# Patient Record
Sex: MALE | Race: OTHER | HISPANIC OR LATINO | URBAN - METROPOLITAN AREA
[De-identification: names, ages, dates, MRNs, and addresses within clinical notes are randomized per-mention and may not be internally consistent; named-entity substitution may affect disease eponyms.]

---

## 2017-03-22 ENCOUNTER — EMERGENCY (EMERGENCY)
Facility: HOSPITAL | Age: 27
LOS: 0 days | Discharge: HOME | End: 2017-03-22

## 2017-06-27 DIAGNOSIS — K64.9 UNSPECIFIED HEMORRHOIDS: ICD-10-CM

## 2017-06-27 DIAGNOSIS — K08.89 OTHER SPECIFIED DISORDERS OF TEETH AND SUPPORTING STRUCTURES: ICD-10-CM

## 2017-06-27 DIAGNOSIS — Z87.891 PERSONAL HISTORY OF NICOTINE DEPENDENCE: ICD-10-CM

## 2017-08-15 ENCOUNTER — EMERGENCY (EMERGENCY)
Facility: HOSPITAL | Age: 27
LOS: 1 days | Discharge: HOME | End: 2017-08-15

## 2017-08-15 DIAGNOSIS — K08.89 OTHER SPECIFIED DISORDERS OF TEETH AND SUPPORTING STRUCTURES: ICD-10-CM

## 2017-08-15 DIAGNOSIS — Z79.899 OTHER LONG TERM (CURRENT) DRUG THERAPY: ICD-10-CM

## 2017-12-11 ENCOUNTER — EMERGENCY (EMERGENCY)
Facility: HOSPITAL | Age: 27
LOS: 0 days | Discharge: HOME | End: 2017-12-11

## 2017-12-11 DIAGNOSIS — K02.9 DENTAL CARIES, UNSPECIFIED: ICD-10-CM

## 2017-12-11 DIAGNOSIS — K08.89 OTHER SPECIFIED DISORDERS OF TEETH AND SUPPORTING STRUCTURES: ICD-10-CM

## 2017-12-11 DIAGNOSIS — Z21 ASYMPTOMATIC HUMAN IMMUNODEFICIENCY VIRUS [HIV] INFECTION STATUS: ICD-10-CM

## 2017-12-11 DIAGNOSIS — K04.7 PERIAPICAL ABSCESS WITHOUT SINUS: ICD-10-CM

## 2017-12-11 DIAGNOSIS — Z79.899 OTHER LONG TERM (CURRENT) DRUG THERAPY: ICD-10-CM

## 2017-12-11 DIAGNOSIS — Z87.891 PERSONAL HISTORY OF NICOTINE DEPENDENCE: ICD-10-CM

## 2021-07-21 ENCOUNTER — EMERGENCY (EMERGENCY)
Facility: HOSPITAL | Age: 31
LOS: 0 days | Discharge: HOME | End: 2021-07-22
Attending: EMERGENCY MEDICINE | Admitting: EMERGENCY MEDICINE
Payer: MEDICAID

## 2021-07-21 VITALS
RESPIRATION RATE: 17 BRPM | HEART RATE: 81 BPM | TEMPERATURE: 98 F | SYSTOLIC BLOOD PRESSURE: 136 MMHG | OXYGEN SATURATION: 100 % | DIASTOLIC BLOOD PRESSURE: 77 MMHG

## 2021-07-21 DIAGNOSIS — K04.7 PERIAPICAL ABSCESS WITHOUT SINUS: ICD-10-CM

## 2021-07-21 DIAGNOSIS — K03.81 CRACKED TOOTH: ICD-10-CM

## 2021-07-21 DIAGNOSIS — K08.89 OTHER SPECIFIED DISORDERS OF TEETH AND SUPPORTING STRUCTURES: ICD-10-CM

## 2021-07-21 DIAGNOSIS — K02.9 DENTAL CARIES, UNSPECIFIED: ICD-10-CM

## 2021-07-21 DIAGNOSIS — K05.219 AGGRESSIVE PERIODONTITIS, LOCALIZED, UNSPECIFIED SEVERITY: ICD-10-CM

## 2021-07-21 DIAGNOSIS — J45.909 UNSPECIFIED ASTHMA, UNCOMPLICATED: ICD-10-CM

## 2021-07-21 PROCEDURE — 99282 EMERGENCY DEPT VISIT SF MDM: CPT

## 2021-07-21 NOTE — ED PROVIDER NOTE - OBJECTIVE STATEMENT
30 y/o male with a PMH of asthma presents to the ED for evaluation of right upper dental pain and swellig x 2 days. pt denies fever, chills, sob, difficulty eating, n/v/d/c, or recent trauma.

## 2021-07-21 NOTE — CONSULT NOTE ADULT - SUBJECTIVE AND OBJECTIVE BOX
Patient is a 31y old  Male who presents with a chief complaint of dental pain on upper right that started two weeks ago.    HPI: Patient has dental pain on tooth #4 that started two weeks ago. Patient is tender to cold and biting. Took advil to alleviate the pain the past two weeks, which helped.      PAST MEDICAL & SURGICAL HISTORY:    MEDICATIONS  (STANDING):    MEDICATIONS  (PRN):      Allergies    No Known Allergies    Intolerances        FAMILY HISTORY:        *Last Dental Visit:    Vital Signs Last 24 Hrs  T(C): 36.8 (21 Jul 2021 13:38), Max: 36.8 (21 Jul 2021 13:38)  T(F): 98.2 (21 Jul 2021 13:38), Max: 98.2 (21 Jul 2021 13:38)  HR: 81 (21 Jul 2021 13:38) (81 - 81)  BP: 136/77 (21 Jul 2021 13:38) (136/77 - 136/77)  BP(mean): --  RR: 17 (21 Jul 2021 13:38) (17 - 17)  SpO2: 100% (21 Jul 2021 13:38) (100% - 100%)    LABS:                  EOE:  TMJ ( -  ) clicks                     ( -  ) pops                     ( -  ) crepitus             Mandible <<FROM>>             Facial bones and MOM <<grossly intact>>             ( -  ) trismus             ( -  ) lymphadenopathy             ( -  ) swelling             ( -  ) asymmetry             ( -  ) palpation             ( -  ) dyspnea             (  - ) dysphagia             ( -  ) loss of consciousness    IOE:  permanent dentition: multiple carious teeth           hard/soft palate:  (  - ) palatal torus           tongue/FOM No pathology noted           labial/buccal mucosa No pathology noted           ( +  ) percussion           (  + ) palpation           ( -  ) swelling            ( +  ) abscess           ( -  ) sinus tract    Dentition present: permanent dentition  Mobility: no mobility  Caries: caries to pulp on tooth #4        *DENTAL RADIOGRAPHS: 1 panoramic, 2 periapicals    RADIOLOGY & ADDITIONAL STUDIES: caries to pulp with periapical radiolucency on tooth #4, residual teeth roots #1 and 2    *ASSESSMENT: Patient is a 31y old  Male who presents with a chief complaint of dental pain on upper right that started two weeks ago. Patient has dental pain on tooth #4. Patient is tender to cold and biting. Patient took advil to alleviate the pain the past two weeks, which helped. Patient is tender to palpation and percussion. No swelling present. Abscess present on the buccal. Explained to patient that the tooth needs to be extracted because it is non restorable. Patient agrees to treatment.      *PLAN: Extraction of tooth #4. All treatment risks and benefits explained to patient as per OS sheet dated 7-. Consent and side/site signed and completed.    PROCEDURE:   Verbal and written consent given.  Anesthesia: 3 carpules of septocaine 4% with 1:100 000 epinephrine   Treatment: 3 carpules of septocaine 4% with 1:100 000 epinephrine administered via local infiltration. Tooth #4 elevated and extracted with forceps. Site curreted and irrigated. Hemostasis obtained. Horizontal incision made on the buccal to drain the abscess. Abscess irrigated. Hemostasis obtained. Patient comfortable. Post op instructions given verbally and written.     RX:  Amoxicilline 500 mg PO TID X 7 days  Ibuprofen 600 mg PO q6h prn    RECOMMENDATIONS:  1) Take Amoxicilline 500 mg PO TID X 7 days  2) Take Ibuprofen 600 mg PO q6h prn  3) Post op instructions given verbally and written  4) Dental F/U with outpatient dentist for comprehensive dental care.   5) If any difficulty swallowing/breathing, fever occur, return to ER.     Leeann Zavala, pager # [phone]

## 2021-07-21 NOTE — ED PROVIDER NOTE - ATTENDING CONTRIBUTION TO CARE
30 yo m with pmh of asthma, presents with R gum pain.  pt admits had cracked his tooth a few weeks ago, but only started hurting 2 days ago.  no fever, chills, facial swelling, diff swallowing or breathing.  exam: gingiva adjacent to ttoh 4,5,6, swollen, fluctuant, erythematous imp: pt with gingival abscess, dental clinic aou

## 2021-07-21 NOTE — ED PROVIDER NOTE - PHYSICAL EXAMINATION
Physical Exam    Vital Signs: I have reviewed the initial vital signs.  Constitutional: well-nourished, appears stated age, no acute distress  Eyes: Conjunctiva pink, Sclera clear  ENT: Oropharynx is clear without lesions. uvula midline. no tonsillar erythema, edema, or exudates. no stridor. no pta. floor of the mouth is soft without pus. (+) right upper tooth #3 cracked and has dental caries. flucutuance tender dental abscess above tooth #3 without drainage.   Cardiovascular: S1 and S2, regular rate, regular rhythm, well-perfused extremities, radial pulses equal and 2+ b/l.   Respiratory: unlabored respiratory effort, clear to auscultation bilaterally no wheezing, rales and rhonchi. pt is speaking full sentences. no accessory muscle use.   Musculoskeletal: supple neck, FROM of b/l upper and lower extremities.   Integumentary: warm, dry, no rash  Neurologic: awake, alert, cranial nerves II-XII grossly intact, extremities’ motor and sensory functions grossly intact. steady gait.   Psychiatric: appropriate mood, appropriate affect

## 2021-07-21 NOTE — ED PROVIDER NOTE - NS ED ROS FT
CONST: No fever, chills or bodyaches  EYES: No pain, redness, drainage or visual changes.  ENT: No ear pain or discharge, nasal discharge or congestion. No sore throat. (+) right upper dental pain and swelling.   CARD: No chest pain, palpitations  RESP: No SOB, cough, hemoptysis. No hx of asthma or COPD  GI: No abdominal pain, N/V/D  : No urinary symptoms  MS: No joint pain, back pain or extremity pain/injury  SKIN: No rashes  NEURO: No headache, dizziness, paresthesias or LOC

## 2021-10-15 ENCOUNTER — EMERGENCY (EMERGENCY)
Facility: HOSPITAL | Age: 31
LOS: 0 days | Discharge: LEFT BEFORE DISCHARGE | End: 2021-10-15
Attending: STUDENT IN AN ORGANIZED HEALTH CARE EDUCATION/TRAINING PROGRAM | Admitting: STUDENT IN AN ORGANIZED HEALTH CARE EDUCATION/TRAINING PROGRAM
Payer: MEDICAID

## 2021-10-15 VITALS
RESPIRATION RATE: 18 BRPM | HEART RATE: 85 BPM | TEMPERATURE: 98 F | DIASTOLIC BLOOD PRESSURE: 77 MMHG | OXYGEN SATURATION: 98 % | SYSTOLIC BLOOD PRESSURE: 138 MMHG

## 2021-10-15 DIAGNOSIS — B20 HUMAN IMMUNODEFICIENCY VIRUS [HIV] DISEASE: ICD-10-CM

## 2021-10-15 DIAGNOSIS — B34.9 VIRAL INFECTION, UNSPECIFIED: ICD-10-CM

## 2021-10-15 DIAGNOSIS — Z20.822 CONTACT WITH AND (SUSPECTED) EXPOSURE TO COVID-19: ICD-10-CM

## 2021-10-15 DIAGNOSIS — R06.02 SHORTNESS OF BREATH: ICD-10-CM

## 2021-10-15 DIAGNOSIS — J45.901 UNSPECIFIED ASTHMA WITH (ACUTE) EXACERBATION: ICD-10-CM

## 2021-10-15 LAB
RAPID RVP RESULT: DETECTED
RV+EV RNA SPEC QL NAA+PROBE: DETECTED
SARS-COV-2 RNA SPEC QL NAA+PROBE: SIGNIFICANT CHANGE UP

## 2021-10-15 PROCEDURE — 99285 EMERGENCY DEPT VISIT HI MDM: CPT

## 2021-10-15 PROCEDURE — 71046 X-RAY EXAM CHEST 2 VIEWS: CPT | Mod: 26

## 2021-10-15 RX ORDER — IPRATROPIUM/ALBUTEROL SULFATE 18-103MCG
3 AEROSOL WITH ADAPTER (GRAM) INHALATION
Refills: 0 | Status: COMPLETED | OUTPATIENT
Start: 2021-10-15 | End: 2021-10-15

## 2021-10-15 RX ORDER — DEXAMETHASONE 0.5 MG/5ML
10 ELIXIR ORAL ONCE
Refills: 0 | Status: COMPLETED | OUTPATIENT
Start: 2021-10-15 | End: 2021-10-15

## 2021-10-15 RX ADMIN — Medication 3 MILLILITER(S): at 12:10

## 2021-10-15 RX ADMIN — Medication 3 MILLILITER(S): at 12:11

## 2021-10-15 RX ADMIN — Medication 10 MILLIGRAM(S): at 11:48

## 2021-10-15 RX ADMIN — Medication 3 MILLILITER(S): at 12:08

## 2021-10-15 NOTE — ED PROVIDER NOTE - OBJECTIVE STATEMENT
30 y/o male with a PMH of asthma, and HIV (on victarvi, does not know his cd4 count, states viral load is undetectable) presents to the ED for evaluation of chest tightness and sob, sore throat, runny nose, and cough with green sputum x 5 days. pt reports he uses his albuterol inhaler and nebulizer and it usually resolves his symptoms however this week it has not. pt reports his brother has similar symptoms. pt is vaccinated against covid 19. pt reports feeling feverish. pt reports he has been taking dayquil, last taken last night. pt denies fever, chills, cough, chest pain, abdominal pain, rashes, n/v/d/c, recent travel, weakness, headaches, or dizziness.

## 2021-10-15 NOTE — ED PROVIDER NOTE - NS ED ROS FT
CONST: No fever, chills or bodyaches  EYES: No pain, redness, drainage or visual changes.  ENT: No ear pain or discharge, nasal discharge or congestion. No sore throat  CARD: No chest pain, palpitations  RESP: (+) SOB, cough. No hemoptysis. (+) hx of asthma   GI: No abdominal pain, N/V/D  : No urinary symptoms  MS: No joint pain, back pain or extremity pain/injury  SKIN: No rashes  NEURO: No headache, dizziness, paresthesias or LOC

## 2021-10-15 NOTE — ED PROVIDER NOTE - PHYSICAL EXAMINATION
Physical Exam    Vital Signs: I have reviewed the initial vital signs.  Constitutional: well-nourished, appears stated age, no acute distress  Eyes: Conjunctiva pink, Sclera clear.   Cardiovascular: S1 and S2, regular rate, regular rhythm, well-perfused extremities, radial pulses equal and 2+ b/l.   Respiratory: unlabored respiratory effort,(+) diffuse b/l wheezing. pt is speaking full sentences. no accessory muscle use.   Gastrointestinal: soft, non-tender, nondistended abdomen, no pulsatile mass, normal bowl sounds, no rebound, no guarding, no organomegaly.   Musculoskeletal: supple neck, no lower extremity edema, no calf tenderness  Integumentary: warm, dry, no rash  Neurologic: awake, alert, cranial nerves II-XII grossly intact, extremities’ motor and sensory functions grossly intact.  steady gait.   Psychiatric: appropriate mood, appropriate affect

## 2021-10-15 NOTE — ED PROVIDER NOTE - CLINICAL SUMMARY MEDICAL DECISION MAKING FREE TEXT BOX
.    32 y/o M pmh Asthma, HIV (+), on meds, VL undetectable, does not know CD4, p/w nasal congestion, sorethroat, chest tightness, wheezing and cough. x 4-5d. Seemed like cold and asthma, but sx not responding to albuterol. No fever, cp, leg swelling. No sick contact. + COVID vax.    Agree w/ exam, plus: ENT: + nasal congestion and rhinorrhea, no OP swelling or exudate.    Pt felt better after nebs. cxr clear.  IMP: viral illness  Pt stable for dc w/ continued oupt w/up, pmd f/up, and care as discussed.  Pt understands plan and signs and symptoms for ED return. DC home.     Pt left before receiving dc papers.    .

## 2021-10-15 NOTE — ED ADULT NURSE NOTE - SUICIDE SCREENING DEPRESSION
Addended by: LUIS DANIEL BOGGS on: 3/17/2021 02:53 PM     Modules accepted: Orders    
Addended by: LUIS DANIEL BOGGS on: 3/24/2021 09:43 AM     Modules accepted: Orders    
Negative

## 2021-10-15 NOTE — ED PROVIDER NOTE - PROGRESS NOTE DETAILS
FF: pt was seen walking around the ED, but not in his spot in the ED. unable to reassess. FF: pt was seen walking around the ED, and eating chicken wings with his mother, but not in his spot in the ED or to be found by myself when searching for pt. unable to reassess; however plan was to discharge if feeling better and symptoms improved. FF: I attempted to call pt but his number is wrong in the chart a different perso answered and it was the wrong number. I attempted to call pt mother katie, but left a voicemail for pt to call back. pt rvp is positive. FF: pt was seen walking around the ED, and eating chicken wings with his mother, but not in his spot in the ED or to be found by myself when searching for pt. unable to reassess; however plan was to discharge if feeling better and symptoms improved. pt eloped.

## 2023-09-09 NOTE — ED PROVIDER NOTE - NSFOLLOWUPINSTRUCTIONS_ED_ALL_ED_FT
Viral Illness, Adult  Viruses are tiny germs that can get into a person's body and cause illness. There are many different types of viruses, and they cause many types of illness. Viral illnesses can range from mild to severe. They can affect various parts of the body.    Common illnesses that are caused by a virus include colds and the flu. Viral illnesses also include serious conditions such as HIV/AIDS (human immunodeficiency virus/acquired immunodeficiency syndrome). A few viruses have been linked to certain cancers.    What are the causes?  Many types of viruses can cause illness. Viruses invade cells in your body, multiply, and cause the infected cells to malfunction or die. When the cell dies, it releases more of the virus. When this happens, you develop symptoms of the illness, and the virus continues to spread to other cells. If the virus takes over the function of the cell, it can cause the cell to divide and grow out of control, as is the case when a virus causes cancer.    Different viruses get into the body in different ways. You can get a virus by:  Swallowing food or water that is contaminated with the virus.  Breathing in droplets that have been coughed or sneezed into the air by an infected person.  Touching a surface that has been contaminated with the virus and then touching your eyes, nose, or mouth.  Being bitten by an insect or animal that carries the virus.  Having sexual contact with a person who is infected with the virus.  Being exposed to blood or fluids that contain the virus, either through an open cut or during a transfusion.  If a virus enters your body, your body's defense system (immune system) will try to fight the virus. You may be at higher risk for a viral illness if your immune system is weak.    What are the signs or symptoms?  Symptoms vary depending on the type of virus and the location of the cells that it invades. Common symptoms of the main types of viral illnesses include:    Cold and flu viruses     Fever.  Headache.  Sore throat.  Muscle aches.  Nasal congestion.  Cough.  Digestive system (gastrointestinal) viruses     Fever.  Abdominal pain.  Nausea.  Diarrhea.  Liver viruses (hepatitis)     Loss of appetite.  Tiredness.  Yellowing of the skin (jaundice).  Brain and spinal cord viruses     Fever.  Headache.  Stiff neck.  Nausea and vomiting.  Confusion or sleepiness.  Skin viruses     Warts.  Itching.  Rash.  Sexually transmitted viruses     Discharge.  Swelling.  Redness.  Rash.  How is this treated?  Viruses can be difficult to treat because they live within cells. Antibiotic medicines do not treat viruses because these drugs do not get inside cells. Treatment for a viral illness may include:  Resting and drinking plenty of fluids.  Medicines to relieve symptoms. These can include over-the-counter medicine for pain and fever, medicines for cough or congestion, and medicines to relieve diarrhea.  Antiviral medicines. These drugs are available only for certain types of viruses. They may help reduce flu symptoms if taken early. There are also many antiviral medicines for hepatitis and HIV/AIDS.  Some viral illnesses can be prevented with vaccinations. A common example is the flu shot.    Follow these instructions at home:  Medicines     Image   Take over-the-counter and prescription medicines only as told by your health care provider.  If you were prescribed an antiviral medicine, take it as told by your health care provider. Do not stop taking the medicine even if you start to feel better.  Be aware of when antibiotics are needed and when they are not needed. Antibiotics do not treat viruses. If your health care provider thinks that you may have a bacterial infection as well as a viral infection, you may get an antibiotic.  Do not ask for an antibiotic prescription if you have been diagnosed with a viral illness. That will not make your illness go away faster.  Frequently taking antibiotics when they are not needed can lead to antibiotic resistance. When this develops, the medicine no longer works against the bacteria that it normally fights.  General instructions     Drink enough fluids to keep your urine clear or pale yellow.  Rest as much as possible.  Return to your normal activities as told by your health care provider. Ask your health care provider what activities are safe for you.  Keep all follow-up visits as told by your health care provider. This is important.  How is this prevented?  Take these actions to reduce your risk of viral infection:Image  Eat a healthy diet and get enough rest.  Wash your hands often with soap and water. This is especially important when you are in public places. If soap and water are not available, use hand .  Avoid close contact with friends and family who have a viral illness.  If you travel to areas where viral gastrointestinal infection is common, avoid drinking water or eating raw food.  Keep your immunizations up to date. Get a flu shot every year as told by your health care provider.  Do not share toothbrushes, nail clippers, razors, or needles with other people.  Always practice safe sex.  Contact a health care provider if:  You have symptoms of a viral illness that do not go away.  Your symptoms come back after going away.  Your symptoms get worse.  Get help right away if:  You have trouble breathing.  You have a severe headache or a stiff neck.  You have severe vomiting or abdominal pain.  This information is not intended to replace advice given to you by your health care provider. Make sure you discuss any questions you have with your health care provider.    Asthma is a common lung disease affecting millions of people worldwide. It is characterized by narrowing of the airways (breathing tubes) in the lungs. This narrowing is partially or completely reversible. Symptoms of asthma include wheezing, coughing, chest tightness, and shortness of breath. These symptoms tend to come and go, and are related to the degree of airway narrowing in the lungs.     The factors that set off and worsen asthma symptoms are called "triggers." Identifying and avoiding asthma triggers are essential steps in preventing asthma flare-ups. Common asthma triggers generally fall into several categories:  ? Allergens (including dust, pollen, mold, cockroaches, mice, cats, and dogs)  ? Respiratory infections (including the common cold)  ? Irritants (such as tobacco smoke, chemicals, and strong odors or fumes)  ? Physical activity, especially when the air that is breathed is cold  ? Certain medicines, known as beta blockers  ? Emotional stress    After identifying potential asthma triggers, try to avoid your triggers entirely. If an asthma trigger cannot be completely avoided, try to limit your exposure as much as possible. You can also take an extra dose of your bronchodilator medication before exposure to an asthma trigger. This is a common approach prior to exercise, and we encourage you NOT to avoid exercise. Talk with a healthcare provider before using this approach in other circumstances, as it should only be used if limiting or avoiding exposure is not possible. Be careful not to use more than twice the amount of medication normally used.      Be sure to take all asthma medications as prescribed.   It is important that you are consistent and do not miss taking the ones that are meant to be taken daily, even if your breathing already feels well.       Please follow up with your Primary Care Doctor or a Pulmonologist (lung doctor) within 1 - 2 weeks of discharge. If you do not already have an asthma action plan, please discuss establishing one with your outpatient doctors.
No

## 2025-05-16 ENCOUNTER — EMERGENCY (EMERGENCY)
Facility: HOSPITAL | Age: 35
LOS: 0 days | Discharge: HOME | End: 2025-05-17
Attending: EMERGENCY MEDICINE
Payer: MEDICAID

## 2025-05-16 VITALS
TEMPERATURE: 99 F | RESPIRATION RATE: 18 BRPM | SYSTOLIC BLOOD PRESSURE: 135 MMHG | WEIGHT: 199.96 LBS | OXYGEN SATURATION: 100 % | HEART RATE: 83 BPM | DIASTOLIC BLOOD PRESSURE: 85 MMHG

## 2025-05-16 DIAGNOSIS — M25.511 PAIN IN RIGHT SHOULDER: ICD-10-CM

## 2025-05-16 DIAGNOSIS — R20.0 ANESTHESIA OF SKIN: ICD-10-CM

## 2025-05-16 DIAGNOSIS — M54.2 CERVICALGIA: ICD-10-CM

## 2025-05-16 DIAGNOSIS — M79.601 PAIN IN RIGHT ARM: ICD-10-CM

## 2025-05-16 DIAGNOSIS — M25.521 PAIN IN RIGHT ELBOW: ICD-10-CM

## 2025-05-16 DIAGNOSIS — Z21 ASYMPTOMATIC HUMAN IMMUNODEFICIENCY VIRUS [HIV] INFECTION STATUS: ICD-10-CM

## 2025-05-16 DIAGNOSIS — R20.2 PARESTHESIA OF SKIN: ICD-10-CM

## 2025-05-16 PROCEDURE — 99284 EMERGENCY DEPT VISIT MOD MDM: CPT | Mod: 25

## 2025-05-16 PROCEDURE — 72125 CT NECK SPINE W/O DYE: CPT

## 2025-05-16 NOTE — ED ADULT TRIAGE NOTE - CHIEF COMPLAINT QUOTE
pt complaining of right arm pain from shoulder to elbow and numbness of his fingers for the past 3 weeks

## 2025-05-17 PROCEDURE — 72125 CT NECK SPINE W/O DYE: CPT | Mod: 26

## 2025-05-17 RX ORDER — KETOROLAC TROMETHAMINE 30 MG/ML
30 INJECTION, SOLUTION INTRAMUSCULAR; INTRAVENOUS ONCE
Refills: 0 | Status: DISCONTINUED | OUTPATIENT
Start: 2025-05-17 | End: 2025-05-17

## 2025-05-17 RX ORDER — KETOROLAC TROMETHAMINE 30 MG/ML
1 INJECTION, SOLUTION INTRAMUSCULAR; INTRAVENOUS
Qty: 16 | Refills: 0
Start: 2025-05-17 | End: 2025-05-20

## 2025-05-17 RX ORDER — METHOCARBAMOL 500 MG/1
1000 TABLET, FILM COATED ORAL ONCE
Refills: 0 | Status: COMPLETED | OUTPATIENT
Start: 2025-05-17 | End: 2025-05-17

## 2025-05-17 RX ADMIN — METHOCARBAMOL 1000 MILLIGRAM(S): 500 TABLET, FILM COATED ORAL at 00:40

## 2025-05-17 RX ADMIN — KETOROLAC TROMETHAMINE 30 MILLIGRAM(S): 30 INJECTION, SOLUTION INTRAMUSCULAR; INTRAVENOUS at 00:39

## 2025-05-17 NOTE — ED PROVIDER NOTE - CARE PROVIDER_API CALL
Selma Valdes  Neurosurgery  [address] [place] [address], [address]  [place] [address]  Phone: [phone]  Fax: [phone]  Follow Up Time: 7-10 Days

## 2025-05-17 NOTE — ED PROVIDER NOTE - ATTENDING APP SHARED VISIT CONTRIBUTION OF CARE
35-year-old male presents for evaluation of 2 weeks of right shoulder pain radiating down to the arm.  Also reports pain worse at the right elbow.  States that all of his digits have some paresthesias.  Patient states that his mom's sometimes stating that he is back to alleviate his chronic pain, denies any fever, no skin redness, no trauma, and he believes she did this prior to the onset of pain.  VSS, non toxic appearing, NAD, Head NCAT, MMM, neck supple, normal ROM, normal s1s2, lungs ctab, abd s/nt/nd, no guarding or rebound, extremities.  Full range of motion of the right elbow, no noted deformities, neurovascularly intact, AAO x 3, GCS 15, neuro grossly except for subjectively decreased sensation, right arm and digits. No acute skin lesions. Plan is imaging, meds and reassess.

## 2025-05-17 NOTE — ED PROVIDER NOTE - OBJECTIVE STATEMENT
35-year-old male PMH HIV viral load undetectable presenting to ED for evaluation of right upper extremity paresthesias for 3 weeks.  Patient reporting right upper shoulder pain/neck region pain radiating down right upper extremity associated with pins and needle sensation.  Patient states 1 morning he woke up with this.  Patient states he was asking his mom to massage him and he is unsure if she injured him when trying to do this.  Denies fever, chills, chest pain, shortness of breath, headache, dizziness.

## 2025-05-17 NOTE — ED PROVIDER NOTE - PATIENT PORTAL LINK FT
You can access the FollowMyHealth Patient Portal offered by [facility] by registering at the following website: [email or web address]. By joining [facility]’s FollowMyHealth portal, you will also be able to view your health information using other applications (apps) compatible with our system.

## 2025-05-17 NOTE — ED PROVIDER NOTE - PHYSICAL EXAMINATION
GENERAL: Well-nourished, Well-developed. NAD.  HEAD: No visible or palpable bumps or hematomas. No ecchymosis behind ears B/L.  Neck: Supple. No cervical midline TTP. No paravertebral TTP to traps. FROM  CVS: Normal S1,S2. No murmurs appreciated on auscultation   RESP: No use of accessory muscles. Chest rise symmetrical with good expansion. Lungs clear to auscultation B/L. No wheezing, rales, or rhonchi auscultated.  Skin: Warm, Dry. No rashes or lesions. Good cap refill < 2 sec B/L.  EXT: Radial and pedal pulses present B/L. No calf tenderness or swelling B/L. No palpable cords. No pedal edema B/L.  Neuro: AA&O x 3. Speaking in full sentences. No slurring of speech. No facial droop. No tremors. (+)decreased sensation to RUE. Strength 5/5 B/L. Gait within normal limits.

## 2025-05-17 NOTE — ED PROVIDER NOTE - NSFOLLOWUPINSTRUCTIONS_ED_ALL_ED_FT
FOLLOW UP WITH NEUROSURGERY CLINIC. TAKE PAIN MEDS AS DISCUSSED WITH FOOD. RETURN TO THE ed IF SYMPTOMS WORSEN.     Our Emergency Department Referral Coordinators will be reaching out to you in the next 24-48 hours from 9:00am to 5:00pm to schedule a follow up appointment. Please expect a phone call from the hospital in that time frame. If you do not receive a call or if you have any questions or concerns, you can reach them at   [phone]    Paresthesia    WHAT YOU NEED TO KNOW:    What is paresthesia? Paresthesia is numbness, tingling, or burning. It can happen in any part of your body, but usually occurs in your legs, feet, arms, or hands.    What causes paresthesia? A large number of conditions can cause paresthesia. Nerves that provide sensation are affected. Paresthesia happens because of changes in these nerves, or in nerve pathways. The changes can be temporary, such as if you take certain medicines or you are not getting enough vitamin B. Nerve damage can lead to permanent paresthesia. Conditions that may cause nerve damage include diabetes, carpel tunnel syndrome, stroke, and multiple sclerosis. The exact cause of your paresthesia may not be known.    What should I tell my healthcare provider about what I feel? You can help your healthcare provider by describing anything you feel, such as the following:    No feeling in the affected area    A feeling of pins and needles    An electric shock feeling    Heaviness    Trouble moving the affected area    A feeling that something is crawling under your skin    A feeling of burning or of cold in the affected area  How is paresthesia diagnosed? Your healthcare provider will examine you and ask about your symptoms. Tell your provider when the symptoms began. Include anything that makes your symptoms worse or better. Your provider will also need to know if you have a disease or condition that could be causing your symptoms. Tell him or her about the medicines you take. Include the amounts you take and when you take each medicine. You may also need any of the following:    Blood tests may show low levels of vitamin B or a high blood sugar level.    X-ray, MRI, or CT scan pictures may show damage to the area where you have paresthesia. You may be given contrast liquid to help the area show up better in the pictures. Tell the healthcare provider if you have ever had an allergic reaction to contrast liquid. Do not enter the MRI room with anything metal. Metal can cause severe injury. Tell the healthcare provider if you have any metal in or on your body.    Nerve conduction studies may be done to test your nerve function.  How is paresthesia treated? Treatment will depend on what is causing your paresthesia. You may need to increase the amount of vitamin B in your blood. Your healthcare provider may change or stop a medicine you are taking that is causing your symptoms. Permanent paresthesia may be helped with nerve medicine. If you have diabetes, your healthcare provider or diabetes specialist can help you control your blood sugar levels. Your provider may recommend a splint or surgery if you have paresthesia caused by carpal tunnel syndrome.    What can I do to manage paresthesia?    Protect the area from injury. You may injure or burn yourself if you lose feeling in the area. Be careful when you touch anything that could be hot. Wear sturdy shoes to protect your feet. Ask about other ways to protect yourself.    Go to physical or occupational therapy if directed. Your provider may recommend therapy if you have a condition such as carpal tunnel syndrome. A physical therapist can teach you exercises to help strengthen the area or increase your ability to move it. An occupational therapist can help you find new ways to do your daily activities.    Manage health conditions that can cause paresthesia. Work with your diabetes specialist if you have uncontrolled diabetes. A dietitian or your healthcare provider can help you create a meal plan if you have low vitamin B levels. Your provider can help you manage your health if you have multiple sclerosis or you had a stroke. It is important to manage health conditions to stop paresthesia or prevent it from getting worse.  When should I seek immediate care?    You have severe pain along with numbness and tingling.    Your legs suddenly become cold. You have trouble moving your legs, and they ache.    You have increased weakness in a part of your body.    You have uncontrolled movements.  When should I contact my healthcare provider?    Your symptoms do not improve.    You have symptoms in more than one part of your body.    You have questions or concerns about your condition or care.  CARE AGREEMENT:    You have the right to help plan your care. Learn about your health condition and how it may be treated. Discuss treatment options with your healthcare providers to decide what care you want to receive. You always have the right to refuse treatment.

## 2025-05-17 NOTE — ED PROVIDER NOTE - CLINICAL SUMMARY MEDICAL DECISION MAKING FREE TEXT BOX
Pt presented for evaluation of 2 week of left am pain and numbness. CT neck done with neg preliminary results. WIll dc pt to f/up outpt with neurosurgery.

## 2025-08-05 ENCOUNTER — EMERGENCY (EMERGENCY)
Facility: HOSPITAL | Age: 35
LOS: 0 days | Discharge: HOME | End: 2025-08-05
Attending: EMERGENCY MEDICINE
Payer: MEDICAID

## 2025-08-05 VITALS
HEART RATE: 70 BPM | DIASTOLIC BLOOD PRESSURE: 78 MMHG | TEMPERATURE: 99 F | WEIGHT: 190.04 LBS | SYSTOLIC BLOOD PRESSURE: 116 MMHG | OXYGEN SATURATION: 99 % | RESPIRATION RATE: 16 BRPM

## 2025-08-05 DIAGNOSIS — Z98.818 OTHER DENTAL PROCEDURE STATUS: ICD-10-CM

## 2025-08-05 DIAGNOSIS — K08.89 OTHER SPECIFIED DISORDERS OF TEETH AND SUPPORTING STRUCTURES: ICD-10-CM

## 2025-08-05 DIAGNOSIS — Z21 ASYMPTOMATIC HUMAN IMMUNODEFICIENCY VIRUS [HIV] INFECTION STATUS: ICD-10-CM

## 2025-08-05 DIAGNOSIS — J45.909 UNSPECIFIED ASTHMA, UNCOMPLICATED: ICD-10-CM

## 2025-08-05 DIAGNOSIS — K02.9 DENTAL CARIES, UNSPECIFIED: ICD-10-CM

## 2025-08-05 PROCEDURE — 99283 EMERGENCY DEPT VISIT LOW MDM: CPT

## 2025-08-05 PROCEDURE — 99282 EMERGENCY DEPT VISIT SF MDM: CPT

## 2025-08-05 RX ORDER — AMOXICILLIN 500 MG/1
1 CAPSULE ORAL
Qty: 21 | Refills: 0
Start: 2025-08-05 | End: 2025-08-11